# Patient Record
Sex: MALE | Race: OTHER | HISPANIC OR LATINO | URBAN - METROPOLITAN AREA
[De-identification: names, ages, dates, MRNs, and addresses within clinical notes are randomized per-mention and may not be internally consistent; named-entity substitution may affect disease eponyms.]

---

## 2021-08-26 ENCOUNTER — EMERGENCY (EMERGENCY)
Facility: HOSPITAL | Age: 41
LOS: 1 days | Discharge: HOME | End: 2021-08-26
Attending: EMERGENCY MEDICINE | Admitting: EMERGENCY MEDICINE
Payer: COMMERCIAL

## 2021-08-26 VITALS
HEART RATE: 63 BPM | TEMPERATURE: 98 F | DIASTOLIC BLOOD PRESSURE: 79 MMHG | RESPIRATION RATE: 18 BRPM | SYSTOLIC BLOOD PRESSURE: 124 MMHG | OXYGEN SATURATION: 98 %

## 2021-08-26 VITALS
TEMPERATURE: 98 F | OXYGEN SATURATION: 97 % | RESPIRATION RATE: 16 BRPM | DIASTOLIC BLOOD PRESSURE: 82 MMHG | SYSTOLIC BLOOD PRESSURE: 145 MMHG | HEART RATE: 69 BPM

## 2021-08-26 DIAGNOSIS — S61.412A LACERATION WITHOUT FOREIGN BODY OF LEFT HAND, INITIAL ENCOUNTER: ICD-10-CM

## 2021-08-26 DIAGNOSIS — S61.012A LACERATION WITHOUT FOREIGN BODY OF LEFT THUMB WITHOUT DAMAGE TO NAIL, INITIAL ENCOUNTER: ICD-10-CM
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
## 2021-08-26 DIAGNOSIS — S61.217A LACERATION WITHOUT FOREIGN BODY OF LEFT LITTLE FINGER WITHOUT DAMAGE TO NAIL, INITIAL ENCOUNTER: ICD-10-CM

## 2021-08-26 DIAGNOSIS — L92.3 FOREIGN BODY GRANULOMA OF THE SKIN AND SUBCUTANEOUS TISSUE: ICD-10-CM
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
## 2021-08-26 DIAGNOSIS — S61.215A LACERATION WITHOUT FOREIGN BODY OF LEFT RING FINGER WITHOUT DAMAGE TO NAIL, INITIAL ENCOUNTER: ICD-10-CM
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
## 2021-08-26 DIAGNOSIS — Z23 ENCOUNTER FOR IMMUNIZATION: ICD-10-CM

## 2021-08-26 DIAGNOSIS — J45.909 UNSPECIFIED ASTHMA, UNCOMPLICATED: ICD-10-CM
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
## 2021-08-26 LAB — HIV 1 & 2 AB SERPL IA.RAPID: SIGNIFICANT CHANGE UP

## 2021-08-26 PROCEDURE — 73120 X-RAY EXAM OF HAND: CPT | Mod: 26,LT

## 2021-08-26 PROCEDURE — 99284 EMERGENCY DEPT VISIT MOD MDM: CPT

## 2021-08-26 RX ORDER — FENTANYL CITRATE 50 UG/ML
75 INJECTION INTRAVENOUS ONCE
Refills: 0 | Status: DISCONTINUED | OUTPATIENT
Start: 2021-08-26 | End: 2021-08-26

## 2021-08-26 RX ORDER — ACETAMINOPHEN 500 MG
650 TABLET ORAL ONCE
Refills: 0 | Status: COMPLETED | OUTPATIENT
Start: 2021-08-26 | End: 2021-08-26

## 2021-08-26 RX ORDER — CEPHALEXIN 500 MG
500 CAPSULE ORAL ONCE
Refills: 0 | Status: COMPLETED | OUTPATIENT
Start: 2021-08-26 | End: 2021-08-26

## 2021-08-26 RX ORDER — CEPHALEXIN 500 MG
1 CAPSULE ORAL
Qty: 40 | Refills: 0
Start: 2021-08-26 | End: 2021-09-04

## 2021-08-26 RX ORDER — CEPHALEXIN 500 MG
250 CAPSULE ORAL ONCE
Refills: 0 | Status: DISCONTINUED | OUTPATIENT
Start: 2021-08-26 | End: 2021-08-26

## 2021-08-26 RX ORDER — TETANUS TOXOID, REDUCED DIPHTHERIA TOXOID AND ACELLULAR PERTUSSIS VACCINE, ADSORBED 5; 2.5; 8; 8; 2.5 [IU]/.5ML; [IU]/.5ML; UG/.5ML; UG/.5ML; UG/.5ML
0.5 SUSPENSION INTRAMUSCULAR ONCE
Refills: 0 | Status: COMPLETED | OUTPATIENT
Start: 2021-08-26 | End: 2021-08-26

## 2021-08-26 RX ADMIN — FENTANYL CITRATE 75 MICROGRAM(S): 50 INJECTION INTRAVENOUS at 19:49

## 2021-08-26 RX ADMIN — Medication 650 MILLIGRAM(S): at 15:14

## 2021-08-26 RX ADMIN — TETANUS TOXOID, REDUCED DIPHTHERIA TOXOID AND ACELLULAR PERTUSSIS VACCINE, ADSORBED 0.5 MILLILITER(S): 5; 2.5; 8; 8; 2.5 SUSPENSION INTRAMUSCULAR at 15:14

## 2021-08-26 RX ADMIN — Medication 500 MILLIGRAM(S): at 21:01

## 2021-08-26 NOTE — ED ADULT NURSE NOTE - CAS EDN DISCHARGE ASSESSMENT
Pt. denies any pain or discomfort at this time. VS WNL and ambulating with a steady gait./Alert and oriented to person, place and time

## 2021-08-26 NOTE — ED PROVIDER NOTE - CARE PROVIDER_API CALL
Eddie Reis)  Plastic Surgery; Surgery of the Hand  [address] [place] [address], [address], [place] [address]  Phone: [phone]  Fax: [phone]  Follow Up Time:

## 2021-08-26 NOTE — ED PROVIDER NOTE - NEUROLOGICAL, MLM
Sensation intact all throughout 5 fingers; normal strength to flexion intension of all joint to left hand

## 2021-08-26 NOTE — CONSULT NOTE ADULT - SUBJECTIVE AND OBJECTIVE BOX
At work caught his left hand on the coils of a AGLOGIC bed resulting in superficial laceration of the radial side of left thumb, laceration of the volar crease of PIP joint, proximal of phalanx of left small finger  Flexor tendon function intact  Normal sensation of finger tips  retained ring on left ring finger

## 2021-08-26 NOTE — ED PROVIDER NOTE - NSFOLLOWUPINSTRUCTIONS_ED_ALL_ED_FT
Please keep dressings on the lacerations, keep it elevated, apply ice, and take tylenol every 4-6 hours for pain. Please take the prescribed antibiotics to help prevent wound infections. Call Dr. Reis's office tomorrow to arrange follow-up with him. If you develop any signs or symptoms of infection such as redness, pus coming from wounds, fever, chills, or a lot of swelling, come back to the ER right away.

## 2021-08-26 NOTE — CONSULT NOTE ADULT - ASSESSMENT
Superficial laceration of left thumb without foreign bodies, laceration of left ring and small fingers without foreign bodies, no flexor tendon injury, no nerve injury  Wounds lavaged, wounds sutured, left thumb wound dressed with xerofoam  Keflex, tetanus  retained ring left ring finger should be removed when ring saw is available.  Follow up in  my office in 5 days

## 2021-08-26 NOTE — ED PROVIDER NOTE - CLINICAL SUMMARY MEDICAL DECISION MAKING FREE TEXT BOX
Pt presents to the ED with multiple laceration with questionable underlying open fracture. No neurovascular involvement or joint involvement.   Vital sign within normal limits. Will order Xray of hand, provide Tylenol, Tetanus. Will contact hand surgeon for laceration repair.

## 2021-08-26 NOTE — ED PROVIDER NOTE - PATIENT PORTAL LINK FT
You can access the FollowMyHealth Patient Portal offered by [facility] by registering at the following website: [email or web address]. By joining [employer]’s FollowMyHealth portal, you will also be able to view your health information using other applications (apps) compatible with our system.

## 2021-08-26 NOTE — ED PROVIDER NOTE - OBJECTIVE STATEMENT
40 y/o M with PMHx of asthma presents to ED for multiple laceration to his L hand. Pt was installing his Franco bed spring when some of the springs popped out and injured him. Pt was able to control the bleeding with pressure. Pain complains of pain. Unknown last tetanus shot.

## 2021-08-26 NOTE — ED ADULT NURSE NOTE - CINV DISCH MEDS REVIEWED YN
Per provider request, RN called patient to check in. RN left message for patient to return call.\ to clinic.   Yes

## 2021-08-26 NOTE — ED PROVIDER NOTE - SKIN, MLM
L hand superficial lacerations: approximately 3 cm radial aspect of L thumb; 1.5 cm long, deep laceration proximal phalanx of 4th digit (volar surface) with surrounding contused tissue; dorsal aspect 4th digit over PIP joint L hand superficial lacerations: approximately 3 cm radial aspect of L thumb; 1.5 cm long, deep laceration proximal phalanx of 4th digit (volar surface) with surrounding contused tissue; dorsal aspect 4th digit over PIP joint. laceration over volar surface of proximal phalanx of 5th digit 1cm long.

## 2021-08-26 NOTE — ED PROVIDER NOTE - PROGRESS NOTE DETAILS
lacerations were repaired by Dr. Reis who recommends keflex for prophylaxis and patient will call his office tomorrow to arrange follow-up appointment. ring remained stuck on swollen 4th finger and ultimately had to be cut off using the dremel, which he tolerated with some pain that was controlled with fentanyl. there was an accidental needlestick to a nurse during administration of medication and patient did consent to rapid HIV testing.

## 2021-08-26 NOTE — ED ADULT NURSE NOTE - OBJECTIVE STATEMENT
Pt presents c/o 4/10 pain to left hand 2ndary to laceration from spring at work.  Pt's hand bandaged, bleeding controlled.  Pt cannot elicit date of last TDAP.  Pt pending XR.